# Patient Record
Sex: FEMALE | Race: BLACK OR AFRICAN AMERICAN | NOT HISPANIC OR LATINO | URBAN - METROPOLITAN AREA
[De-identification: names, ages, dates, MRNs, and addresses within clinical notes are randomized per-mention and may not be internally consistent; named-entity substitution may affect disease eponyms.]

---

## 2024-06-01 ENCOUNTER — INPATIENT (INPATIENT)
Facility: HOSPITAL | Age: 44
LOS: 0 days | Discharge: HOME | DRG: 305 | End: 2024-06-02
Attending: STUDENT IN AN ORGANIZED HEALTH CARE EDUCATION/TRAINING PROGRAM | Admitting: STUDENT IN AN ORGANIZED HEALTH CARE EDUCATION/TRAINING PROGRAM
Payer: COMMERCIAL

## 2024-06-01 VITALS
OXYGEN SATURATION: 100 % | SYSTOLIC BLOOD PRESSURE: 199 MMHG | HEIGHT: 66 IN | WEIGHT: 179.9 LBS | DIASTOLIC BLOOD PRESSURE: 129 MMHG | HEART RATE: 77 BPM | RESPIRATION RATE: 18 BRPM | TEMPERATURE: 99 F

## 2024-06-01 DIAGNOSIS — I10 ESSENTIAL (PRIMARY) HYPERTENSION: ICD-10-CM

## 2024-06-01 LAB
ALBUMIN SERPL ELPH-MCNC: 3.9 G/DL — SIGNIFICANT CHANGE UP (ref 3.5–5)
ALP SERPL-CCNC: 63 U/L — SIGNIFICANT CHANGE UP (ref 40–120)
ALT FLD-CCNC: 23 U/L DA — SIGNIFICANT CHANGE UP (ref 10–60)
ANION GAP SERPL CALC-SCNC: 3 MMOL/L — LOW (ref 5–17)
AST SERPL-CCNC: 17 U/L — SIGNIFICANT CHANGE UP (ref 10–40)
BASOPHILS # BLD AUTO: 0.07 K/UL — SIGNIFICANT CHANGE UP (ref 0–0.2)
BASOPHILS NFR BLD AUTO: 0.9 % — SIGNIFICANT CHANGE UP (ref 0–2)
BILIRUB SERPL-MCNC: 0.6 MG/DL — SIGNIFICANT CHANGE UP (ref 0.2–1.2)
BUN SERPL-MCNC: 12 MG/DL — SIGNIFICANT CHANGE UP (ref 7–18)
CALCIUM SERPL-MCNC: 8.9 MG/DL — SIGNIFICANT CHANGE UP (ref 8.4–10.5)
CHLORIDE SERPL-SCNC: 109 MMOL/L — HIGH (ref 96–108)
CK SERPL-CCNC: 92 U/L — SIGNIFICANT CHANGE UP (ref 21–215)
CO2 SERPL-SCNC: 27 MMOL/L — SIGNIFICANT CHANGE UP (ref 22–31)
CREAT SERPL-MCNC: 0.87 MG/DL — SIGNIFICANT CHANGE UP (ref 0.5–1.3)
EGFR: 85 ML/MIN/1.73M2 — SIGNIFICANT CHANGE UP
EOSINOPHIL # BLD AUTO: 0.24 K/UL — SIGNIFICANT CHANGE UP (ref 0–0.5)
EOSINOPHIL NFR BLD AUTO: 3 % — SIGNIFICANT CHANGE UP (ref 0–6)
GLUCOSE SERPL-MCNC: 85 MG/DL — SIGNIFICANT CHANGE UP (ref 70–99)
HCG SERPL-ACNC: <1 MIU/ML — SIGNIFICANT CHANGE UP
HCT VFR BLD CALC: 44.3 % — SIGNIFICANT CHANGE UP (ref 34.5–45)
HGB BLD-MCNC: 14 G/DL — SIGNIFICANT CHANGE UP (ref 11.5–15.5)
IMM GRANULOCYTES NFR BLD AUTO: 0.3 % — SIGNIFICANT CHANGE UP (ref 0–0.9)
LYMPHOCYTES # BLD AUTO: 2.09 K/UL — SIGNIFICANT CHANGE UP (ref 1–3.3)
LYMPHOCYTES # BLD AUTO: 26.2 % — SIGNIFICANT CHANGE UP (ref 13–44)
MCHC RBC-ENTMCNC: 26.8 PG — LOW (ref 27–34)
MCHC RBC-ENTMCNC: 31.6 GM/DL — LOW (ref 32–36)
MCV RBC AUTO: 84.9 FL — SIGNIFICANT CHANGE UP (ref 80–100)
MONOCYTES # BLD AUTO: 0.45 K/UL — SIGNIFICANT CHANGE UP (ref 0–0.9)
MONOCYTES NFR BLD AUTO: 5.6 % — SIGNIFICANT CHANGE UP (ref 2–14)
NEUTROPHILS # BLD AUTO: 5.12 K/UL — SIGNIFICANT CHANGE UP (ref 1.8–7.4)
NEUTROPHILS NFR BLD AUTO: 64 % — SIGNIFICANT CHANGE UP (ref 43–77)
NRBC # BLD: 0 /100 WBCS — SIGNIFICANT CHANGE UP (ref 0–0)
NT-PROBNP SERPL-SCNC: 47 PG/ML — SIGNIFICANT CHANGE UP (ref 0–125)
PLATELET # BLD AUTO: 307 K/UL — SIGNIFICANT CHANGE UP (ref 150–400)
POTASSIUM SERPL-MCNC: 3.8 MMOL/L — SIGNIFICANT CHANGE UP (ref 3.5–5.3)
POTASSIUM SERPL-SCNC: 3.8 MMOL/L — SIGNIFICANT CHANGE UP (ref 3.5–5.3)
PROT SERPL-MCNC: 7.6 G/DL — SIGNIFICANT CHANGE UP (ref 6–8.3)
RBC # BLD: 5.22 M/UL — HIGH (ref 3.8–5.2)
RBC # FLD: 12.6 % — SIGNIFICANT CHANGE UP (ref 10.3–14.5)
SODIUM SERPL-SCNC: 139 MMOL/L — SIGNIFICANT CHANGE UP (ref 135–145)
TROPONIN I, HIGH SENSITIVITY RESULT: 4.4 NG/L — SIGNIFICANT CHANGE UP
WBC # BLD: 7.99 K/UL — SIGNIFICANT CHANGE UP (ref 3.8–10.5)
WBC # FLD AUTO: 7.99 K/UL — SIGNIFICANT CHANGE UP (ref 3.8–10.5)

## 2024-06-01 PROCEDURE — 99285 EMERGENCY DEPT VISIT HI MDM: CPT

## 2024-06-01 PROCEDURE — 99222 1ST HOSP IP/OBS MODERATE 55: CPT | Mod: GC

## 2024-06-01 PROCEDURE — 71046 X-RAY EXAM CHEST 2 VIEWS: CPT | Mod: 26

## 2024-06-01 RX ORDER — LABETALOL HCL 100 MG
10 TABLET ORAL ONCE
Refills: 0 | Status: COMPLETED | OUTPATIENT
Start: 2024-06-01 | End: 2024-06-01

## 2024-06-01 RX ORDER — HYDRALAZINE HCL 50 MG
10 TABLET ORAL ONCE
Refills: 0 | Status: COMPLETED | OUTPATIENT
Start: 2024-06-01 | End: 2024-06-01

## 2024-06-01 RX ORDER — ACETAMINOPHEN 500 MG
975 TABLET ORAL ONCE
Refills: 0 | Status: COMPLETED | OUTPATIENT
Start: 2024-06-01 | End: 2024-06-01

## 2024-06-01 RX ADMIN — Medication 10 MILLIGRAM(S): at 19:09

## 2024-06-01 RX ADMIN — Medication 975 MILLIGRAM(S): at 23:08

## 2024-06-01 RX ADMIN — Medication 10 MILLIGRAM(S): at 20:38

## 2024-06-01 NOTE — ED PROVIDER NOTE - CARE PLAN
Principal Discharge DX:	Hypertension   1 Principal Discharge DX:	Hypertension  Secondary Diagnosis:	Hypertensive urgency

## 2024-06-01 NOTE — ED ADULT NURSE NOTE - NSFALLUNIVINTERV_ED_ALL_ED
Bed/Stretcher in lowest position, wheels locked, appropriate side rails in place/Call bell, personal items and telephone in reach/Instruct patient to call for assistance before getting out of bed/chair/stretcher/Non-slip footwear applied when patient is off stretcher/[place] to call system/Physically safe environment - no spills, clutter or unnecessary equipment/Purposeful proactive rounding/Room/bathroom lighting operational, light cord in reach

## 2024-06-01 NOTE — ED PROVIDER NOTE - CLINICAL SUMMARY MEDICAL DECISION MAKING FREE TEXT BOX
With asymptomatic hypertension presents for critically elevated blood pressure.  Will do labs x-ray medication reassess.

## 2024-06-01 NOTE — ED PROVIDER NOTE - PROGRESS NOTE DETAILS
BP not substantially responding to Emergency Department management. patient developed headache after IV labetalol. admit for Hypertensive urgency

## 2024-06-01 NOTE — H&P ADULT - ASSESSMENT
43-year-old female w/ PMHx of HTN non-compliant with medication,  presents with asymptomatic hypertensive reading at out-patient office visit. Vitals in the ED sig for 199 systolic > went down to 150s systolic, diastolic elevated around 110-120s. Labs wnl, trop neg, proBNP neg, HCG neg, EKG NSR. CXR wnl. Admitted for hypertensive urgency.

## 2024-06-01 NOTE — ED ADULT TRIAGE NOTE - CHIEF COMPLAINT QUOTE
Reports going to OB clinic for irregular menstruation and found to have elevated b/p ,hence referred to ER to [facility] for same ,denied any headache/discomfort

## 2024-06-01 NOTE — H&P ADULT - ATTENDING COMMENTS
IMAGING  Chest X-Ray  Pending official read; on my read no infiltrate, consolidation, or effusion noted.    EKG  Normal Sinus Rhythm, 65 bpm, QTc 426ms, VA 140ms, on my read no ST segment elevation or depression, no TWI    HPI  43 year old female patient with pmhx HTN who presented to the ER sent by her ObGyn for HTN Crisis.  Blood pressure of 199/129 in the ER. Patient states she was on antihypertensives about 4 years ago but stopped taking them as she felt she got better with lifestyle management. She does not check her blood pressure at home and does not follow up with a PCP.     Review Of Systems included: + headache,   no fever, no chills, no lightheadedness, no dizziness, no chest pain, no shortness of breath, no numbness, no weakness, no diarrhea, no constipation, no dysuria    Physical Exam  General: Awake, Alert, Oriented  Cardiac: RRR  Pulmonary: CTA b/l  Abdominal: Soft, ND, NT  Extremities: No edema b/l    A/P  # HTN Urgency  Admission indicated for: Clinical care (e.g. testing, monitoring, or treatment) needed beyond the usual emergency department time frame. Also: Hypertension that is severe (SBP greater than 180 mmHg or DBP greater than 120 mmHg) and Monitoring of initial antihypertensive treatment needed (e.g. to establish appropriate outpatient regimen)  Patient does not take any antihypertensive medications at home  - Stage 2 Hypertension; will start 2 antihypertensive medications  - Start Nifedipine ER 30mg daily  - Start HCTZ 12.5mg daily  - Monitor blood pressure  - Tylenol or NSAID prn for headache    # DVT PPx  - Lovenox    # FEN  - Monitor and replete electrolytes as needed  - DASH Diet    Patient case and management was discussed with ER Attending  I did examine all labs (including CBC, CMP, Troponin, HCG, CPK, BNP), imaging, prior notes IMAGING  Chest X-Ray  Pending official read; on my read no infiltrate, consolidation, or effusion noted.    EKG  Normal Sinus Rhythm, 65 bpm, QTc 426ms, NV 140ms, on my read no ST segment elevation or depression, no TWI    HPI  43 year old female patient with pmhx HTN who presented to the ER sent by her ObGyn for HTN Crisis.  Blood pressure of 199/129 in the ER. Patient states she was on antihypertensives about 4 years ago but stopped taking them as she felt she got better with lifestyle management. She does not check her blood pressure at home and does not follow up with a PCP.     Review Of Systems included: + headache,   no fever, no chills, no lightheadedness, no dizziness, no chest pain, no shortness of breath, no numbness, no weakness, no diarrhea, no constipation, no dysuria    Physical Exam  General: Awake, Alert, Oriented  Cardiac: RRR  Pulmonary: CTA b/l  Abdominal: Soft, ND, NT  Extremities: No edema b/l    A/P  # HTN Urgency  Admission indicated for: Clinical care (e.g. testing, monitoring, or treatment) needed beyond the usual emergency department time frame. Also: Hypertension that is severe (SBP greater than 180 mmHg or DBP greater than 120 mmHg) and Monitoring of initial antihypertensive treatment needed (e.g. to establish appropriate outpatient regimen)  Patient does not take any antihypertensive medications at home  - Stage 2 Hypertension; will start 2 antihypertensive medications  - Start Nifedipine ER 30mg daily  - Start HCTZ 12.5mg daily  - Consider discharge with home blood pressure cuff for home blood pressure monitoring  - Patient will need a new PCP as she currently does not have one  - Monitor blood pressure  - Tylenol or NSAID prn for headache    # DVT PPx  - Lovenox    # FEN  - Monitor and replete electrolytes as needed  - DASH Diet    Patient case and management was discussed with ER Attending  I did examine all labs (including CBC, CMP, Troponin, HCG, CPK, BNP), imaging, prior notes IMAGING  Chest X-Ray  Pending official read; on my read no infiltrate, consolidation, or effusion noted.    EKG  Normal Sinus Rhythm, 65 bpm, QTc 426ms, WA 140ms, on my read no ST segment elevation or depression, no TWI    HPI  43 year old female patient with pmhx HTN who presented to the ER sent by her ObGyn for HTN Crisis.  Blood pressure of 199/129 in the ER. Patient states she was on antihypertensives about 4 years ago but stopped taking them as she felt she got better with lifestyle management. She does not check her blood pressure at home and does not follow up with a PCP.     Review Of Systems included: + headache, + intermittent blurry vision,   no fever, no chills, no lightheadedness, no dizziness, no chest pain, no shortness of breath, no numbness, no weakness, no diarrhea, no constipation, no dysuria    Physical Exam  General: Awake, Alert, Oriented  Cardiac: RRR  Pulmonary: CTA b/l  Abdominal: Soft, ND, NT  Extremities: No edema b/l    A/P  # HTN Urgency  Admission indicated for: Clinical care (e.g. testing, monitoring, or treatment) needed beyond the usual emergency department time frame. Also: Hypertension that is severe (SBP greater than 180 mmHg or DBP greater than 120 mmHg) and Monitoring of initial antihypertensive treatment needed (e.g. to establish appropriate outpatient regimen)  Patient does not take any antihypertensive medications at home  Patient with headache and intermittent blurry vision likely 2/2 HTN  - Stage 2 Hypertension; will start 2 antihypertensive medications  - Start Nifedipine ER 30mg daily  - Start HCTZ 12.5mg daily  - Consider discharge with home blood pressure cuff for home blood pressure monitoring  - Patient will need a new PCP as she currently does not have one; will need outpatient follow up  - Monitor blood pressure  - Tylenol or NSAID prn for headache    # DVT PPx  - Lovenox    # FEN  - Monitor and replete electrolytes as needed  - DASH Diet    Patient case and management was discussed with ER Attending  I did examine all labs (including CBC, CMP, Troponin, HCG, CPK, BNP), imaging, prior notes IMAGING  Chest X-Ray  Pending official read; on my read no infiltrate, consolidation, or effusion noted.    EKG  Normal Sinus Rhythm, 65 bpm, QTc 426ms, MA 140ms, on my read no ST segment elevation or depression, no TWI    HPI  43 year old female patient with pmhx HTN who presented to the ER sent by her ObGyn for HTN Crisis.  Blood pressure of 199/129 in the ER. Patient states she was on antihypertensives about 4 years ago but stopped taking them as she felt she got better with lifestyle management. She does not check her blood pressure at home and does not follow up with a PCP.     Review Of Systems included: + headache, + intermittent blurry vision,   no fever, no chills, no lightheadedness, no dizziness, no chest pain, no shortness of breath, no numbness, no weakness, no diarrhea, no constipation, no dysuria    Physical Exam  General: Awake, Alert, Oriented  Cardiac: RRR  Pulmonary: CTA b/l  Abdominal: Soft, ND, NT  Extremities: No edema b/l    A/P  # HTN Urgency  Admission indicated for: Clinical care (e.g. testing, monitoring, or treatment) needed beyond the usual emergency department time frame. Also: Hypertension that is severe (SBP greater than 180 mmHg or DBP greater than 120 mmHg) and Monitoring of initial antihypertensive treatment needed (e.g. to establish appropriate outpatient regimen)  Patient does not take any antihypertensive medications at home  Patient with headache and intermittent blurry vision likely 2/2 HTN Crisis  - Stage 2 Hypertension; will start 2 antihypertensive medications  - Start Nifedipine ER 30mg daily  - Start HCTZ 12.5mg daily  - Consider discharge with home blood pressure cuff for home blood pressure monitoring  - Patient will need a new PCP as she currently does not have one; will need outpatient follow up  - Monitor blood pressure  - Tylenol or NSAID prn for headache    # DVT PPx  - Lovenox    # FEN  - Monitor and replete electrolytes as needed  - DASH Diet    Patient case and management was discussed with ER Attending  I did examine all labs (including CBC, CMP, Troponin, HCG, CPK, BNP), imaging, prior notes

## 2024-06-01 NOTE — ED ADULT TRIAGE NOTE - BP NONINVASIVE DIASTOLIC (MM HG)
Last filled 2021 according to chart
Patient is requesting       phentermine 37.5 MG capsule [[id number]]       [facility] ( outpatient ) [facility]
Pt given an appt
This is a controlled drug and patient will need to be evaluated in the office prior to prescription
129

## 2024-06-01 NOTE — ED ADULT NURSE NOTE - OBJECTIVE STATEMENT
Pt sent from OB/GYN for elevated BP. Pt denies HA, dizziness, nausea vomiting and tingling sensation.

## 2024-06-01 NOTE — H&P ADULT - NSHPPHYSICALEXAM_GEN_ALL_CORE
T(C): 37.2 (06-01-24 @ 23:51), Max: 37.2 (06-01-24 @ 17:04)  HR: 73 (06-02-24 @ 03:14) (73 - 92)  BP: 156/118 (06-02-24 @ 03:14) (156/118 - 199/129)  RR: 18 (06-02-24 @ 03:14) (18 - 18)  SpO2: 100% (06-02-24 @ 03:14) (100% - 100%)    CONSTITUTIONAL: Well groomed, no apparent distress, obese female   EYES: PERRLA and symmetric, EOMI, No conjunctival or scleral injection, non-icteric  ENMT: Oral mucosa with moist membranes.    RESP: No respiratory distress, no use of accessory muscles; CTA b/l, no WRR  CV: RRR, +S1S2, no MRG; no JVD; no peripheral edema  GI: Soft, NT, ND, no rebound, no guarding; no palpable masses; no hepatosplenomegaly; no hernia palpated  MSK: Normal gait;  Normal ROM without pain, no spinal tenderness, normal muscle strength/tone  SKIN: No rashes or ulcers noted; no subcutaneous nodules or induration palpable  NEURO: CN II-XII intact; normal reflexes in upper and lower extremities, sensation intact in upper and lower extremities b/l to light touch   PSYCH: Appropriate insight/judgment; A+O x 3, mood and affect appropriate, recent/remote memory intact

## 2024-06-01 NOTE — H&P ADULT - PROBLEM SELECTOR PLAN 1
- presents with asymptomatic hypertensive reading at out-patient office visit. Pt non-compliant with her medication, unaware of which medication she was prescribed 4 years ago, has not seen a PCP in 4 years   - Vitals in the ED sig for 199 systolic > went down to 150s systolic, diastolic elevated around 110-120s.   - Labs wnl, trop neg, proBNP neg, HCG neg  - EKG NSR. CXR wnl.   - Admitted for hypertensive urgency.   - will start losartan 25mg qd AM dose  - for headache pain: tylenol or ibuprofen PRN  - Primary team to find PCP out patient to continue management - presents with asymptomatic hypertensive reading at out-patient office visit. Pt non-compliant with her medication, unaware of which medication she was prescribed 4 years ago, has not seen a PCP in 4 years   - Vitals in the ED sig for 199 systolic > went down to 150s systolic, diastolic elevated around 110-120s.   - Labs wnl, trop neg, proBNP neg, HCG neg  - EKG NSR. CXR wnl.   - Admitted for hypertensive urgency. Pt has stage 2 hypertension (first line in  [place] for stage 2 HTN CCB and diuretics)  - will start 2 anti-hypertensives- nifedipine ER, and HCTZ   - for headache pain: tylenol or ibuprofen PRN  - Primary team to find PCP out patient to continue management  - pts headaches and blurry vision likely sequela of HTN, but if symptoms persist with better BP control, consider head imaging  - if truly resistant hypertension, consider renal US for renal a. stenosis, MARILIN, pseudotumor cerebri etc.

## 2024-06-01 NOTE — ED PROVIDER NOTE - PHYSICAL EXAMINATION
Heart regular lungs clear abdomen soft nontender no calf swelling no edema well-appearing not in any distress.

## 2024-06-01 NOTE — H&P ADULT - HISTORY OF PRESENT ILLNESS
43-year-old female w/ PMHx of HTN,  presents with asymptomatic hypertensive reading at out-patient office visit.  She was at her GYN's office for abnormal uterine bleeding this AM and was found to have severely elevated blood pressure in the office, so advised that she go to the ED.  Patient states she used to be take blood pressure medication four years ago but then stopped on her own. She was also started on a medication by her cardiologist 4 years ago for a "leaky heart valve" but stopped taking that as well. Pt has not seen a PCP/cardiologist in 4 years. Pt mentions that for the past few months, she had been having intermittent episodes of blurry vision and headaches, that she thought was attributed to her birth control (she is on Nexplanon for 4 years now).     Pt visited GYN yesterday for heavy bleeding and spotting in between periods for the past few months (pt says that this happened since she was on Nexplanon). Also endorses low libido. Prior to Nexplanon she was on depo-provera shots for BC and did not have period issues. . Not currently on her menses.      She denies any chest pain dizziness shortness of breath, blurry vision at this time.  (+) pt does endorse headache since being in the ED. Denies any other medical problems.

## 2024-06-01 NOTE — ED PROVIDER NOTE - OBJECTIVE STATEMENT
3-year-old female presents with asymptomatic hypertension.  She was at her GYN's office for abnormal uterine bleeding and was found to have severely elevated blood pressure.  Patient states she is to be taking some blood pressure medication for years ago but then stopped.  She denies any chest pain headache dizziness shortness of breath blurry vision or any other complaints.  Denies any other medical problems.  She was told by the cardiologist 4 years ago that she had a leaky heart valve and was experiencing palpitations for which she was started on a medication.  She only took 1 month and then is not sure whether she was told to discontinue it or discontinued on her own.

## 2024-06-01 NOTE — H&P ADULT - NSHPREVIEWOFSYSTEMS_GEN_ALL_CORE
REVIEW OF SYSTEMS:    CONSTITUTIONAL: No weakness, fevers or chills  EYES/ENT: No visual changes;  No vertigo or throat pain   NECK: No pain or stiffness  RESPIRATORY: No cough, wheezing, hemoptysis; No shortness of breath  CARDIOVASCULAR: No chest pain or palpitations  GASTROINTESTINAL: No abdominal or epigastric pain. No nausea, vomiting, or hematemesis; No diarrhea or constipation. No melena or hematochezia.  GENITOURINARY: No dysuria, frequency or hematuria  NEUROLOGICAL: No numbness or weakness (+) headache   SKIN: No itching, burning, rashes, or lesions   All other review of systems is negative unless indicated above.

## 2024-06-02 VITALS
HEART RATE: 84 BPM | SYSTOLIC BLOOD PRESSURE: 146 MMHG | OXYGEN SATURATION: 100 % | RESPIRATION RATE: 16 BRPM | DIASTOLIC BLOOD PRESSURE: 99 MMHG | TEMPERATURE: 98 F

## 2024-06-02 DIAGNOSIS — I10 ESSENTIAL (PRIMARY) HYPERTENSION: ICD-10-CM

## 2024-06-02 DIAGNOSIS — Z29.9 ENCOUNTER FOR PROPHYLACTIC MEASURES, UNSPECIFIED: ICD-10-CM

## 2024-06-02 DIAGNOSIS — I16.0 HYPERTENSIVE URGENCY: ICD-10-CM

## 2024-06-02 LAB
ALBUMIN SERPL ELPH-MCNC: 3.6 G/DL — SIGNIFICANT CHANGE UP (ref 3.5–5)
ALP SERPL-CCNC: 62 U/L — SIGNIFICANT CHANGE UP (ref 40–120)
ALT FLD-CCNC: 21 U/L DA — SIGNIFICANT CHANGE UP (ref 10–60)
ANION GAP SERPL CALC-SCNC: 4 MMOL/L — LOW (ref 5–17)
AST SERPL-CCNC: 9 U/L — LOW (ref 10–40)
BASOPHILS # BLD AUTO: 0.06 K/UL — SIGNIFICANT CHANGE UP (ref 0–0.2)
BASOPHILS NFR BLD AUTO: 0.7 % — SIGNIFICANT CHANGE UP (ref 0–2)
BILIRUB SERPL-MCNC: 0.6 MG/DL — SIGNIFICANT CHANGE UP (ref 0.2–1.2)
BUN SERPL-MCNC: 13 MG/DL — SIGNIFICANT CHANGE UP (ref 7–18)
CALCIUM SERPL-MCNC: 8.7 MG/DL — SIGNIFICANT CHANGE UP (ref 8.4–10.5)
CHLORIDE SERPL-SCNC: 108 MMOL/L — SIGNIFICANT CHANGE UP (ref 96–108)
CO2 SERPL-SCNC: 27 MMOL/L — SIGNIFICANT CHANGE UP (ref 22–31)
CREAT SERPL-MCNC: 0.92 MG/DL — SIGNIFICANT CHANGE UP (ref 0.5–1.3)
EGFR: 80 ML/MIN/1.73M2 — SIGNIFICANT CHANGE UP
EOSINOPHIL # BLD AUTO: 0.24 K/UL — SIGNIFICANT CHANGE UP (ref 0–0.5)
EOSINOPHIL NFR BLD AUTO: 2.6 % — SIGNIFICANT CHANGE UP (ref 0–6)
GLUCOSE SERPL-MCNC: 111 MG/DL — HIGH (ref 70–99)
HCT VFR BLD CALC: 40.8 % — SIGNIFICANT CHANGE UP (ref 34.5–45)
HGB BLD-MCNC: 12.9 G/DL — SIGNIFICANT CHANGE UP (ref 11.5–15.5)
IMM GRANULOCYTES NFR BLD AUTO: 0.2 % — SIGNIFICANT CHANGE UP (ref 0–0.9)
LYMPHOCYTES # BLD AUTO: 2.45 K/UL — SIGNIFICANT CHANGE UP (ref 1–3.3)
LYMPHOCYTES # BLD AUTO: 26.9 % — SIGNIFICANT CHANGE UP (ref 13–44)
MAGNESIUM SERPL-MCNC: 2.2 MG/DL — SIGNIFICANT CHANGE UP (ref 1.6–2.6)
MCHC RBC-ENTMCNC: 26.7 PG — LOW (ref 27–34)
MCHC RBC-ENTMCNC: 31.6 GM/DL — LOW (ref 32–36)
MCV RBC AUTO: 84.3 FL — SIGNIFICANT CHANGE UP (ref 80–100)
MONOCYTES # BLD AUTO: 0.5 K/UL — SIGNIFICANT CHANGE UP (ref 0–0.9)
MONOCYTES NFR BLD AUTO: 5.5 % — SIGNIFICANT CHANGE UP (ref 2–14)
NEUTROPHILS # BLD AUTO: 5.83 K/UL — SIGNIFICANT CHANGE UP (ref 1.8–7.4)
NEUTROPHILS NFR BLD AUTO: 64.1 % — SIGNIFICANT CHANGE UP (ref 43–77)
NRBC # BLD: 0 /100 WBCS — SIGNIFICANT CHANGE UP (ref 0–0)
PHOSPHATE SERPL-MCNC: 3.1 MG/DL — SIGNIFICANT CHANGE UP (ref 2.5–4.5)
PLATELET # BLD AUTO: 296 K/UL — SIGNIFICANT CHANGE UP (ref 150–400)
POTASSIUM SERPL-MCNC: 4 MMOL/L — SIGNIFICANT CHANGE UP (ref 3.5–5.3)
POTASSIUM SERPL-SCNC: 4 MMOL/L — SIGNIFICANT CHANGE UP (ref 3.5–5.3)
PROT SERPL-MCNC: 7.1 G/DL — SIGNIFICANT CHANGE UP (ref 6–8.3)
RBC # BLD: 4.84 M/UL — SIGNIFICANT CHANGE UP (ref 3.8–5.2)
RBC # FLD: 12.6 % — SIGNIFICANT CHANGE UP (ref 10.3–14.5)
SODIUM SERPL-SCNC: 139 MMOL/L — SIGNIFICANT CHANGE UP (ref 135–145)
WBC # BLD: 9.1 K/UL — SIGNIFICANT CHANGE UP (ref 3.8–10.5)
WBC # FLD AUTO: 9.1 K/UL — SIGNIFICANT CHANGE UP (ref 3.8–10.5)

## 2024-06-02 PROCEDURE — 93005 ELECTROCARDIOGRAM TRACING: CPT

## 2024-06-02 PROCEDURE — 96375 TX/PRO/DX INJ NEW DRUG ADDON: CPT

## 2024-06-02 PROCEDURE — 96374 THER/PROPH/DIAG INJ IV PUSH: CPT

## 2024-06-02 PROCEDURE — 83880 ASSAY OF NATRIURETIC PEPTIDE: CPT

## 2024-06-02 PROCEDURE — 99285 EMERGENCY DEPT VISIT HI MDM: CPT | Mod: 25

## 2024-06-02 PROCEDURE — 71046 X-RAY EXAM CHEST 2 VIEWS: CPT

## 2024-06-02 PROCEDURE — 82550 ASSAY OF CK (CPK): CPT

## 2024-06-02 PROCEDURE — 85025 COMPLETE CBC W/AUTO DIFF WBC: CPT

## 2024-06-02 PROCEDURE — 36415 COLL VENOUS BLD VENIPUNCTURE: CPT

## 2024-06-02 PROCEDURE — 99239 HOSP IP/OBS DSCHRG MGMT >30: CPT

## 2024-06-02 PROCEDURE — 84702 CHORIONIC GONADOTROPIN TEST: CPT

## 2024-06-02 PROCEDURE — 80053 COMPREHEN METABOLIC PANEL: CPT

## 2024-06-02 PROCEDURE — 84100 ASSAY OF PHOSPHORUS: CPT

## 2024-06-02 PROCEDURE — 84484 ASSAY OF TROPONIN QUANT: CPT

## 2024-06-02 PROCEDURE — 83735 ASSAY OF MAGNESIUM: CPT

## 2024-06-02 RX ORDER — AMLODIPINE BESYLATE 2.5 MG/1
5 TABLET ORAL DAILY
Refills: 0 | Status: DISCONTINUED | OUTPATIENT
Start: 2024-06-02 | End: 2024-06-02

## 2024-06-02 RX ORDER — NIFEDIPINE 30 MG
30 TABLET, EXTENDED RELEASE 24 HR ORAL DAILY
Refills: 0 | Status: DISCONTINUED | OUTPATIENT
Start: 2024-06-02 | End: 2024-06-02

## 2024-06-02 RX ORDER — NIFEDIPINE 30 MG
1 TABLET, EXTENDED RELEASE 24 HR ORAL
Qty: 30 | Refills: 0
Start: 2024-06-02

## 2024-06-02 RX ORDER — IBUPROFEN 200 MG
600 TABLET ORAL ONCE
Refills: 0 | Status: COMPLETED | OUTPATIENT
Start: 2024-06-02 | End: 2024-06-02

## 2024-06-02 RX ORDER — ACETAMINOPHEN 500 MG
650 TABLET ORAL EVERY 6 HOURS
Refills: 0 | Status: DISCONTINUED | OUTPATIENT
Start: 2024-06-02 | End: 2024-06-02

## 2024-06-02 RX ADMIN — Medication 30 MILLIGRAM(S): at 06:39

## 2024-06-02 RX ADMIN — Medication 975 MILLIGRAM(S): at 01:24

## 2024-06-02 RX ADMIN — Medication 600 MILLIGRAM(S): at 02:29

## 2024-06-02 RX ADMIN — Medication 650 MILLIGRAM(S): at 14:21

## 2024-06-02 RX ADMIN — Medication 650 MILLIGRAM(S): at 11:51

## 2024-06-02 RX ADMIN — Medication 600 MILLIGRAM(S): at 05:04

## 2024-06-02 NOTE — DISCHARGE NOTE NURSING/CASE MANAGEMENT/SOCIAL WORK - NSDCPECAREEDUMAT _GEN_ALL_CORE
Dr Kings Schmitt/Radiology  Immediate Findings  MRI/ L Knee    Results are in Epic  Influenza Vaccination

## 2024-06-02 NOTE — ED ADULT NURSE REASSESSMENT NOTE - NS ED NURSE REASSESS COMMENT FT1
Spoke with LOS Edwards who stated that pt was able to go to the floor with /118 d/t hypertension urgency and didn't want to lower down blood pressure quickly, but 4th Floor RN didn't want to take this report d/t high BP. pt's BP initially 199/129 and got hydralazine 10 mg/ Labetalol 10 mg yesterday, now /118 and pt was stable all day and night time.

## 2024-06-02 NOTE — DISCHARGE NOTE PROVIDER - ATTENDING DISCHARGE PHYSICAL EXAMINATION:
Vital Signs Last 24 Hrs  T(C): 36.9 (02 Jun 2024 13:21), Max: 37.2 (01 Jun 2024 17:04)  T(F): 98.4 (02 Jun 2024 13:21), Max: 99 (01 Jun 2024 17:04)  HR: 84 (02 Jun 2024 13:21) (69 - 92)  BP: 146/99 (02 Jun 2024 13:21) (146/99 - 199/129)  BP(mean): --  RR: 16 (02 Jun 2024 13:21) (16 - 18)  SpO2: 100% (02 Jun 2024 13:21) (100% - 100%)    Parameters below as of 02 Jun 2024 13:21  Patient On (Oxygen Delivery Method): room air    CONSTITUTIONAL: Well appearing, well nourished, awake, alert and in no apparent distress  ENMT: Airway patent, Nasal mucosa clear. Mouth with normal mucosa. Throat has no vesicles, no oropharyngeal exudates and uvula is midline.  EYES: Clear bilaterally, pupils equal, round and reactive to light. EOMI.  CARDIAC: Normal rate, regular rhythm.  Heart sounds S1, S2.  No murmurs, rubs or gallops   RESPIRATORY: Breath sounds clear and equal bilaterally. No wheezes, rhales or rhonchi  MUSCULOSKELETAL: Spine appears normal, range of motion is not limited, no muscle or joint tenderness  EXTREMITIES: No edema, cyanosis or deformity   NEUROLOGICAL: Alert and oriented, no focal deficits, no motor or sensory deficits.  SKIN: No rash, skin turgor

## 2024-06-02 NOTE — DISCHARGE NOTE PROVIDER - NSDCCPCAREPLAN_GEN_ALL_CORE_FT
PRINCIPAL DISCHARGE DIAGNOSIS  Diagnosis: Hypertension  Assessment and Plan of Treatment: You were sent to the hospital from your GYN's office secondary to elevated BP. While you were in the ER your BP was 199 systolic >, diastolic elevated around 110-120s. You were treated with Nifedipine and low dose of hydrochlotrothiazide. Your Blood pressure is now controlled and you were medically cleared to be discharged on Nifedipine 30 mg daily  Low salt diet  Activity as tolerated.  Take all medication as prescribed.  Follow up with your medical doctor for routine blood pressure monitoring at your next visit. PLEASE FOLLOW UP WITH A PCP IN A WEEK  Notify your doctor if you have any of the following symptoms:   Dizziness, Lightheadedness, Blurry vision, Headache, Chest pain, Shortness of breath        SECONDARY DISCHARGE DIAGNOSES  Diagnosis: Hypertensive urgency  Assessment and Plan of Treatment:      PRINCIPAL DISCHARGE DIAGNOSIS  Diagnosis: Hypertension  Assessment and Plan of Treatment: You were sent to the hospital from your GYN's office secondary to elevated BP. While you were in the ER your BP was 199 systolic >, diastolic elevated around 110-120s. You were treated with Nifedipine and low dose of hydrochlotrothiazide. Your Blood pressure is now controlled and you were medically cleared to be discharged on Nifedipine 30 mg daily  Low salt diet  Activity as tolerated.  Take all medication as prescribed.  Follow up with your medical doctor for routine blood pressure monitoring at your next visit. PLEASE FOLLOW UP WITH A PCP IN A WEEK  Notify your doctor if you have any of the following symptoms:   Dizziness, Lightheadedness, Blurry vision, Headache, Chest pain, Shortness of breath        SECONDARY DISCHARGE DIAGNOSES  Diagnosis: Hypertensive urgency  Assessment and Plan of Treatment: Resolved, care plan as above

## 2024-06-02 NOTE — DISCHARGE NOTE PROVIDER - HOSPITAL COURSE
43-year-old female w/ PMHx of HTN non-compliant with medication,  presents with asymptomatic hypertensive reading at out-patient office visit. Vitals in the ED sig for 199 systolic > went down to 150s systolic, diastolic elevated around 110-120s. Labs wnl, trop neg, proBNP neg, HCG neg, EKG NSR. CXR wnl. Admitted for hypertensive urgency. Admitted for hypertensive urgency. Pt has stage 2 hypertension (first line in  [place] for stage 2 HTN CCB and diuretics). Started on anti-hypertensives- nifedipine ER, and HCTZ . Patient's BP improved. Patient was given a list of outpatient PCP's . She stated she will find one doctor on her own. Will discharge on Nifedipine 30 mg daily.   Given patient's improved clinical status and current hemodynamic stability, decision was made to discharge.  Please refer to patient's complete medical chart with documents for a full hospital course, for this is only a brief summary.

## 2024-06-02 NOTE — DISCHARGE NOTE NURSING/CASE MANAGEMENT/SOCIAL WORK - PATIENT PORTAL LINK FT
You can access the FollowMyHealth Patient Portal offered by [facility] by registering at the following website: [email or web address]. By joining [facility]’s FollowMyHealth portal, you will also be able to view your health information using other applications (apps) compatible with our system.

## 2024-06-02 NOTE — DISCHARGE NOTE PROVIDER - NSFOLLOWUPCLINICS_GEN_ALL_ED_FT
[facility]  Internal Medicine  [address] [place] [address]  Phone: [phone]  Fax: [phone]  Follow Up Time: 1-3 days

## 2024-06-02 NOTE — DISCHARGE NOTE NURSING/CASE MANAGEMENT/SOCIAL WORK - NSDCPEFALRISK_GEN_ALL_CORE
For information on Fall & Injury Prevention, visit: [email or web address] OR  [email or web address] OR  https://www.cdc.gov/steadi/patient.html

## 2024-06-02 NOTE — DISCHARGE NOTE PROVIDER - CARE PROVIDER_API CALL
Mathew Batista  [facility]  [address] within [facility], [address]  [place] [address]  Phone: [phone]  Fax: [phone]  Follow Up Time: